# Patient Record
Sex: MALE | Race: WHITE | Employment: OTHER | URBAN - METROPOLITAN AREA
[De-identification: names, ages, dates, MRNs, and addresses within clinical notes are randomized per-mention and may not be internally consistent; named-entity substitution may affect disease eponyms.]

---

## 2018-05-02 PROBLEM — K21.9 GASTROESOPHAGEAL REFLUX DISEASE: Status: ACTIVE | Noted: 2018-05-02

## 2018-05-02 PROBLEM — F17.210 TOBACCO SMOKER, LESS THAN 10 CIGARETTES PER DAY: Status: ACTIVE | Noted: 2018-05-02

## 2018-05-02 PROBLEM — E55.9 VITAMIN D DEFICIENCY: Status: ACTIVE | Noted: 2018-05-02

## 2018-05-02 PROBLEM — N13.8 BPH WITH OBSTRUCTION/LOWER URINARY TRACT SYMPTOMS: Status: ACTIVE | Noted: 2018-05-02

## 2018-05-02 PROBLEM — N40.1 BPH WITH OBSTRUCTION/LOWER URINARY TRACT SYMPTOMS: Status: ACTIVE | Noted: 2018-05-02

## 2018-05-02 NOTE — TELEPHONE ENCOUNTER
Medical Records Request received from pt requesting ALL records from Dr Reyna Ta (NEW PT) Record Request faxed to them at [phone]  # [phone]

## 2018-05-02 NOTE — PROGRESS NOTES
Patient presents with:  Establish Care: New patient     HPI:   Sherice Flynn is a 80year old male who presents to the office as a new patient for medication refills. His previous doctor has moved back to [place].   Dr. Sarai Cho (was being seen in [place] • No Known Problems Daughter          Social History  Social History   Marital status:   Spouse name: N/A    Years of education: N/A  Number of children: N/A     Occupational History  None on file     Social History Main Topics   Smoking status: C rhythm  Abdomen: soft, non-tender; bowel sounds normal; no masses,  no organomegaly  Extremities: extremities normal, atraumatic, no cyanosis or edema. R foot with thickened callouses on bottom. Between 3 and 4 toes, wart noted.   Pulses: 2+ and symmetric refilled  Check CBC  - Ferrous Sulfate 325 (65 Fe) MG Oral Tab; Take 1 tablet (325 mg total) by mouth daily with breakfast.  Dispense: 90 tablet; Refill: 3  - CBC WITH DIFFERENTIAL WITH PLATELET; Future    4.  Hyperlipidemia, unspecified hyperlipidemia type

## 2018-05-25 NOTE — TELEPHONE ENCOUNTER
Marco Oleary from [facility] in [facility] is calling to clarify diagnoses code for patient's labs.

## 2018-05-30 PROBLEM — I12.9 CKD STAGE 4 SECONDARY TO HYPERTENSION (HCC): Status: ACTIVE | Noted: 2018-05-30

## 2018-05-30 PROBLEM — N18.4 CKD STAGE 4 SECONDARY TO HYPERTENSION (HCC): Status: ACTIVE | Noted: 2018-05-30

## 2018-05-30 PROBLEM — R73.03 PRE-DIABETES: Status: ACTIVE | Noted: 2018-05-30

## 2018-05-30 NOTE — PATIENT INSTRUCTIONS
To do list:  [  ] see kidney specialist (out in [facility] if insurance will allow it)  [  ] start taking the water pill furosemide  [  ] My office will track down the records of your colonoscopy from a few yrs ago  [  ] would like to get the old medical recor criteria:   • Men who are 73-68 years old and have smoked more than 100 cigarettes in their lifetime   • Anyone with a family history    Colorectal Cancer Screening Covered up to Age 76     Colonoscopy Screen   Covered every 10 years- more often if abnorma Homosexual men   Illicit injectable drug abusers     Tetanus Toxoid- Only covered with a cut with metal- TD and TDaP Not covered by Medicare Part B) No orders found for this or any previous visit.  This may be covered with your prescription benefits, but

## 2018-06-06 NOTE — TELEPHONE ENCOUNTER
Received Form from [facility] requesting last office notes,1 yr of BMP labs,any Renal testing and med list.

## 2018-06-06 NOTE — TELEPHONE ENCOUNTER
Faxed back records requested (recent office notes and med list) NO other testing or labs to [phone]

## 2018-06-08 NOTE — TELEPHONE ENCOUNTER
Message     Message Contents   MD Jesica Armstrong RN             Colonoscopy - yes, last 3 yrs ago. Deirdre Ashby at [facility] in [facility].

## 2018-06-08 NOTE — TELEPHONE ENCOUNTER
[address] [address] [address] [place] [address]  [phone]    Call and request a copy of Colonoscopy

## 2018-06-11 NOTE — TELEPHONE ENCOUNTER
Pts daughter Dr Michelle Bishop is asking for labs to be faxed to [phone], Attn: Rasheeda 07/13/18 at 11 am with Dr Michelle Bishop. They are from the nephrology office phone number is [phone].

## 2018-06-11 NOTE — TELEPHONE ENCOUNTER
[address] and was given Fax # [phone] for us to fax over on cover sheet requesting Colonoscopy for pt  Fax sent

## 2018-06-18 NOTE — TELEPHONE ENCOUNTER
Fax sent again to [phone] [facility] to get copy of pt's Colonoscopy

## 2018-07-02 NOTE — TELEPHONE ENCOUNTER
Spoke to Medical Records and informed them that we had already faxed over request on pt's Colonoscopy twice.  Was informed to re-fax again and they will take care of it and notify us if no records were found  (fax # [phone])

## 2018-07-13 NOTE — PROGRESS NOTES
Nephrology Consult Note    REASON FOR CONSULT: CKD 4     ASSESSMENT/PLAN:        1) CKD 4- recent serum creatinine 2.55 mg/dL is of unclear duration; creatinine was 0.9 mg/dL on an ER visit in 2012.   The differential diagnosis includes prerenal azotemia du having nearly  in  from decompensated liver failure due to alcoholic hepatitis and cirrhosis. Denies any history of acute or chronic renal failure gross microscopic hematuria proteinuric kidney stones or infections.   No history of cardiac pulmonar by mouth daily.  Disp: 90 tablet Rfl: 3   Ferrous Sulfate 325 (65 Fe) MG Oral Tab Take 1 tablet (325 mg total) by mouth daily with breakfast. Disp: 90 tablet Rfl: 3   omeprazole 20 MG Oral Capsule Delayed Release Take 1 capsule (20 mg total) by mouth every extremities  Skin: Warm and dry, no rashes        Kaur Edwards Cha, MD  7/13/2018  11:58 AM

## 2018-07-16 NOTE — TELEPHONE ENCOUNTER
Received Colonoscopy results for pt from [address] [place] [address].  Results handed to Dr Rory Henriquez

## 2018-07-27 ENCOUNTER — TELEPHONE (OUTPATIENT)
Dept: NEPHROLOGY | Facility: CLINIC | Age: 68
End: 2018-07-27

## 2018-07-27 NOTE — TELEPHONE ENCOUNTER
Needs clarification on order. Test not run: monoclonal protein study. Will hold tube until Monday. [phone]. Pls call to clarify of this is protein electrophoresis.

## 2018-08-07 NOTE — TELEPHONE ENCOUNTER
Patient's daughter faxed over patient's test results and blood pressure readings.  Daughter would like to know if patient should follow with Dr. Carson Heath or Dr. Quinton Iglesias  Daughter states- Dr. Quinton Iglesias recommended patient to stop taking amlopidine besylate (due to swe

## 2018-08-07 NOTE — TELEPHONE ENCOUNTER
\Renata notified. Shannon Ramsey will explain to Pt. Shannon Ramsey will call office if there is a problem.

## 2018-08-07 NOTE — TELEPHONE ENCOUNTER
BPs consistently elevated  Serum creatinine elevated - 2.3  Need to control BP further without affecting kidney  Pulse has been uppers 60s-70-80s. Will add carvedilol.   Start with 12.5mg tab - first day take a 1/2 tab in the AM and 1/2 tab in the PM.  On

## 2018-08-09 NOTE — TELEPHONE ENCOUNTER
D/w daughter- will resume amlodipine 5 mg qd, continue metoprolol (hold off on coreg for now), and start torsemide 20 mg qd; will schedule renal biopsy given proteinuria- johny cazares

## 2018-08-09 NOTE — TELEPHONE ENCOUNTER
Pt's daughter needs clarification on meds. At ofc visit, you stopped amlodipine (swelling) and increased enalapril. Do you want him to go back on the amlodipine? Should he decrease enalapril back to original dose or continue increased dose?   [phone]

## 2018-11-30 NOTE — PROGRESS NOTES
Patient presents with:  Renal Failure: sees Dr Arden Allen  Imm/Inj: flu shot for Seniors     HPI:   Hoa Lambert is a 80year old male who presents to the office for f/u renal failure. Seeing Dr. Arden Allen - nephrology. The plan is BP control.       BP at home well appearing, and in no distress  Chest - clear to auscultation, no wheezes, rales or rhonchi, symmetric air entry  Heart - normal rate, regular rhythm, normal S1, S2, no murmurs, rubs, clicks or gallops  Extremities - peripheral pulses normal, no pedal

## 2019-04-09 NOTE — TELEPHONE ENCOUNTER
Medication refilled per protocol.      Requested Prescriptions     Signed Prescriptions Disp Refills   • ROSUVASTATIN CALCIUM 10 MG Oral Tab 90 tablet 1     Sig: TAKE 1 TABLET BY MOUTH ONCE DAILY AT NIGHT     Authorizing Provider: Patrick Ochoa

## 2019-04-25 NOTE — TELEPHONE ENCOUNTER
LOV 11/30/2018     Future Appointments   Date Time Provider Cee Frida   5/29/2019 11:00 AM Jomar Clement MD [facility]    Metoprolol fails protocol because last creatinine level was 2.35 on 7/27/18. Last potassium level was 4.8 on 7/27/18

## 2019-05-23 NOTE — TELEPHONE ENCOUNTER
Called patient to fill out annual health assesment form, spoke with patient's daughter jami, completed form, form in triage.

## 2019-05-29 PROBLEM — E55.9 VITAMIN D DEFICIENCY: Status: RESOLVED | Noted: 2018-05-02 | Resolved: 2019-05-29

## 2019-05-29 NOTE — PROGRESS NOTES
HPI:   Rox Win is a 77year old male who presents for a MA (Medicare Advantage) Supervisit (Once per calendar year). Preventative Screening  Colonoscopy - overdue. Pt says he has a c-scope in [facility] around three years ago.   He repor planning including the explanation and discussion of advance directives standard forms performed Face to Face with patient and Family/surrogate (if present), and forms available to patient in AVS         He currently smokes tobacco.  Social History    [place] times daily. amLODIPine Besylate 5 MG Oral Tab Take 1 tablet (5 mg total) by mouth daily. torsemide 20 MG Oral Tab Take 1 tablet (20 mg total) by mouth daily.    DUTASTERIDE-TAMSULOSIN HCL 0.5-0.4 MG Oral Cap TAKE 1 CAPSULE BY MOUTH ONCE DAILY   SANDY the following:    Height as of this encounter: 60.25\". Weight as of this encounter: 187 lb.     Medicare Hearing Assessment  (Required for AWV/SWV)    Finger Rub          Visual Acuity  Right Eye Visual Acuity: Uncorrected Right Eye Chart Acuity: 20/60 well  Several ongoing medical issues but all seemingly stable  c-socpe - patient reports EGD and cscope in [place] about 3 yrs ago. Will try to get more info, records. Immun UTD. Will eventually need #2 PNA 23 vaccine.     2. Essential hypertens symptoms  Known BPH  No current limitations, urination ko. 13. Gastroesophageal reflux disease, esophagitis presence not specified  Taking omepeprazole  Given h/o cirrhosis, need to prevent acid and varices.              Diet assessment: fair     PLAN: 05/25/2020  Update Health Maintenance if applicable     Immunizations (Update Immunization Activity if applicable)     Influenza  Covered Annually 11/30/2018   Please get every year    Pneumococcal 13 (Prevnar)  Covered Once after 65 05/30/2018 Please get

## 2019-05-29 NOTE — PATIENT INSTRUCTIONS
Mega Silva's SCREENING SCHEDULE   Tests on this list are recommended by your physician but may not be covered, or covered at this frequency, by your insurer. Please check with your insurance carrier before scheduling to verify coverage.     PREVEN Health Maintenance if applicable    Flex Sigmoidoscopy Screen  Covered every 5 years No results found for this or any previous visit. No flowsheet data found.      Fecal Occult Blood   Covered Annually No results found for: FOB, OCCULTSTOOL No flowsheet aric (Not covered by Medicare Part B) No orders found for this or any previous visit. This may be covered with your pharmacy  prescription benefits     Recommended Websites for Advanced Directives    [email or web address]

## 2019-09-20 NOTE — TELEPHONE ENCOUNTER
Results received and reviewed by Dr Christie López. She requests patient needs to go to the ER. Attempted to call patient on mobile number, no answer/no voicemail. Left message on home number for patient to call back today.      Also spoke with patient's daughter (

## 2019-09-20 NOTE — TELEPHONE ENCOUNTER
? Do we have any of the other results? That is remarkably high. He would need to go to ER for evaluation if that is the case.

## 2019-09-20 NOTE — TELEPHONE ENCOUNTER
Referral entered for Dr Arden Allen. Daughter is going to try to get him in some time early next week.

## 2019-09-20 NOTE — TELEPHONE ENCOUNTER
Christina Graff from [address] [place] [address]  is calling to speak with a nurse or Dr. Vicky Downing regarding the patient's abnormal labs.

## 2019-09-20 NOTE — TELEPHONE ENCOUNTER
Spoke to ER provider. He had questioned reason for sending to ER. States patient asymptomatic and appears well on exam (no signs of infection, pain, dehydration, etc). He had recommended outpatient follow up with nephrology.  Sent message to Dr. Grace Kothari who duron

## 2019-09-20 NOTE — TELEPHONE ENCOUNTER
Hi Dr. Chantelle Burgos all is well with you. We are helping to try to manage this patient's labs while his PCP is out of the office. It looks like you had seen this patient last July.  We received critical results from an outside lab 92, creatinine 3.84 this

## 2019-09-20 NOTE — TELEPHONE ENCOUNTER
Rhonda Gu at [facility] called to report a critical lab value_BUN 92. Asked that he fax us the results as well. Routed to Dr Pat Colby.

## 2019-09-23 NOTE — TELEPHONE ENCOUNTER
D/w daughter- needs kidney biopsy- to be scheduled before office appt 10/9- was supposed to have this done August 2018 but never followed thru- johny cazares

## 2019-09-26 NOTE — TELEPHONE ENCOUNTER
Received fax from THE [facility], patient having Renal Biopsy 10/09/19 with Dr. Hosea Mena. Patient scheduled with Dr. Jone Ann 10/02/19.  Fax in triage

## 2019-10-02 NOTE — PROGRESS NOTES
Patient presents with:  Pre-Op Exam: renal biopsy on 10/09/2019     HPI:   Christine Helms is a 77year old male who is being evaluated for pre-surgical clearance at the request of Dr. Trinity Curiel.    Surgery: renal biopsy  Surgeon: Trinity Curiel  Date of Surgery: 10/9/ tablet Rfl: 3   OMEPRAZOLE 20 MG Oral Capsule Delayed Release TAKE 1 CAPSULE BY MOUTH ONCE DAILY BEFORE BREAKFAST Disp: 90 capsule Rfl: 3   METOPROLOL SUCCINATE ER 25 MG Oral Tablet 24 Hr TAKE 1 TABLET BY MOUTH TWICE DAILY Disp: 180 tablet Rfl: 3   ROSUVAS daughter Chito Jarvis  Eyes - pupils equal and reactive, extraocular eye movements intact  Nose - normal and patent, no erythema, discharge or polyps  Mouth - mucous membranes moist, pharynx normal without lesions  Chest - clear to auscultation, no wheezes, rales

## 2019-10-09 NOTE — PROCEDURES
[facility]  Procedure Note    Robin Marker Patient Status:  Outpatient    1950 MRN [id number]   [facility] CT Attending Inge Reeves MD   [facility] Day # 0 PCP Shailesh Rubio MD     Procedure: CT guided renal biopsy    Pre-Pr

## 2019-10-09 NOTE — IMAGING NOTE
Post kidney biopsy. Tolerated procedure and sedation well. Right flank with tegaderm CDI. Report called to [facility]. Pt drowsy and oriented x 4, but easily aroused with voice commands. Denies any pain.

## 2019-10-11 NOTE — TELEPHONE ENCOUNTER
Requested Prescriptions     Pending Prescriptions Disp Refills   • ROSUVASTATIN CALCIUM 10 MG Oral Tab [Pharmacy Med Name: ROSUVASTATIN 10MG TAB] 90 tablet 1     Sig: TAKE 1 TABLET BY MOUTH NIGHTLY     LOV 10/2/2019     Patient was asked to follow-up in: P

## 2019-10-14 NOTE — PROGRESS NOTES
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    ROS:    Denies fever/chills  Denies wt loss/gain  Denies HA or visual changes  Denies CP or palpitations  Denies SOB/cough/hemoptysis  Denies abd or flank pain  Denies N/V/D  Denies change in urinary habits or gross hematuria  Denies LE edema  Ruy MOUTH ONCE DAILY, Disp: 90 tablet, Rfl: 3  OMEPRAZOLE 20 MG Oral Capsule Delayed Release, TAKE 1 CAPSULE BY MOUTH ONCE DAILY BEFORE BREAKFAST, Disp: 90 capsule, Rfl: 3  METOPROLOL SUCCINATE ER 25 MG Oral Tablet 24 Hr, TAKE 1 TABLET BY MOUTH TWICE DAILY, Graham Johnson bowel sounds, no palpable organomegaly  Extremities: Without clubbing, cyanosis or edema.   Neurologic:  normal affect, cranial nerves grossly intact, moving all extremities  Skin: Warm and dry, no rashes        Kaur Ontiveros MD  10/14/2019  210 PM

## 2019-10-21 NOTE — TELEPHONE ENCOUNTER
Patient currently paying $258 for a 90 day supply of DUTASTERIDE-TAMSULOSIN HCL 0.5-0.4 MG Oral Cap    Reviewed GoodRx:     Combo $160.49 at [facility] (90 days)     Alternative:  Dutasteride 0.5mg #90 tabs - $40.61 at [facility]   Tamsulosin 0.4mg  #90 tabs - $

## 2019-10-21 NOTE — TELEPHONE ENCOUNTER
Patient's son is calling he wants to know if there is anything to lower his costs in a 80 supply for his Dutasteride-Tamsulosin. He has lost his drug insurance coverage. The son is not on his FYI.  His father is there and can give permission verbally to

## 2019-10-21 NOTE — TELEPHONE ENCOUNTER
Absolutely splitting the meds is an option. I'm more than happy to order this, and try to cut down the costs    orders sent. Jevon Jaramillo

## 2019-11-07 NOTE — TELEPHONE ENCOUNTER
Please call back with  Creatine level for her Father. She said Dr Noam Malin her father personally and said his levels were better. But with her dads broken language. ... she wanted to make sure that is was true.  Please call Daughter Osito Melchor back to go over

## 2019-11-08 NOTE — TELEPHONE ENCOUNTER
D/w daughter at length- pt to proceed with transplant eval as renal function will likely decline slowly- johny cazares

## 2019-11-12 NOTE — TELEPHONE ENCOUNTER
Son reports patient with sneezing and cough x 3 days. Afebrile. Son asking what patient can take for cough. D/w  [facility] who recommends:     Patient avoid anything with sudafed. Can try: Mucinex, Coricidin or Robitussin.     Reviewed directives with son (

## 2019-11-12 NOTE — TELEPHONE ENCOUNTER
Patient's son is calling to find out what patient can take for the flu.  Patient's son in not listed on his HIPAA but patient is with him to give nurse a verbal to allow us to speak to his son

## 2019-11-26 NOTE — PROGRESS NOTES
Patient presents with:  ER F/U: wheezzig and cough        HISTORY OF PRESENT ILLNESS  Layo Kelsey is a 77year old male who presents for follow up ER visit. He was seen at [facility] on 11.21 for evaluation of cough.  He was dx with uvulitis a tablet (5 mg total) by mouth daily. , Disp: 90 tablet, Rfl: 3  •  torsemide 20 MG Oral Tab, Take 1 tablet (20 mg total) by mouth daily. , Disp: 90 tablet, Rfl: 3  •  POTASSIUM CHLORIDE ER 10 MEQ Oral Tab CR, TAKE 1 TABLET BY MOUTH ONCE DAILY, Disp: 90 tablet Posterior oropharynx clear without exudate or erythema. NECK: Supple without lymphadenopathy. CARDIOVASCULAR: Regular rate and rhythm, no murmurs/ rubs/ gallops. PULMONARY: Normal effort on room air. Clear to auscultation bilaterally.  No adventitious br

## 2020-04-01 NOTE — TELEPHONE ENCOUNTER
Requested Prescriptions     Pending Prescriptions Disp Refills   • ROSUVASTATIN CALCIUM 10 MG Oral Tab [Pharmacy Med Name: ROSUVASTATIN 10MG TABLETS] 90 tablet 0     Sig: TAKE 1 TABLET BY MOUTH EVERY NIGHT AT BEDTIME     LOV 11/26/2019         Appointment

## 2020-04-16 NOTE — TELEPHONE ENCOUNTER
Refill request for:    Requested Prescriptions     Pending Prescriptions Disp Refills   • METOPROLOL SUCCINATE ER 25 MG Oral Tablet 24 Hr [Pharmacy Med Name: METOPROLOL ER SUCCINATE 25MG TABS] 180 tablet 3     Sig: TAKE 1 TABLET BY MOUTH TWICE DAILY   • FE

## 2020-05-29 NOTE — TELEPHONE ENCOUNTER
We can approve this for the time being but with kidney function where it is I would encourage them to contact nephro Gloria Harada to make sure we are not making the kidney worse by using this med at this dose

## 2020-05-29 NOTE — TELEPHONE ENCOUNTER
Refill request for Torsemide fails protocol because last creatinine level was 3.84 on 9/20/2019. LOV 11/26/2019. No future appointments. Routed to Dr Michael Malik.

## 2020-06-01 NOTE — TELEPHONE ENCOUNTER
Betsey Nazario patient's daughter is asking if her dad should continue taking torsemide 20 mg because Gerald Cruz only refilled it for 30 days because he states he doesn't know if he should still take it because if his kidney issues.  Please call Betsey Nazario back to explain

## 2020-07-06 NOTE — TELEPHONE ENCOUNTER
Son is calling and the Patient was told he had enough pills and we denied his refill,  but the bottle says 30 pills no refills. Torsemide 20 mg patient is completely out.   He needs it ASAP to [facility] [place]    Dr. Ernie Figueroa said the benefits in taking th

## 2020-07-06 NOTE — TELEPHONE ENCOUNTER
Chart reviewed - appears Dr Ernie Figueroa refilled this for him on 6/1 with 1 yr worth of refills. Called and advised pt of this. Patient verbalized understanding.

## 2020-07-14 NOTE — TELEPHONE ENCOUNTER
Please assist patient in scheduling appointment      Refill request for:          Requested Prescriptions     Pending Prescriptions Disp Refills   • METOPROLOL SUCCINATE ER 25 MG Oral Tablet 24 Hr [Pharmacy Med Name: METOPROLOL ER SUCCINATE 25MG TABS] 180

## 2020-10-12 NOTE — PROGRESS NOTES
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  90 tablet 3   • POTASSIUM CHLORIDE ER 10 MEQ Oral Tab CR TAKE 1 TABLET BY MOUTH ONCE DAILY 90 tablet 3   • OMEPRAZOLE 20 MG Oral Capsule Delayed Release TAKE 1 CAPSULE BY MOUTH ONCE DAILY BEFORE BR bowel sounds, no palpable organomegaly  Extremities: Without clubbing, cyanosis or edema.   Neurologic:  normal affect, cranial nerves grossly intact, moving all extremities  Skin: Warm and dry, no rashes        Ken-Ap Mora MD  10/12/2020  136 PM

## 2020-10-15 NOTE — TELEPHONE ENCOUNTER
Please enter lab orders for the patient's upcoming physical appointment. Physical scheduled:    Your appointments     Date & Time Appointment Department [facility])    Nov 06, 2020  1:30 PM JANINA PERERA Supervisit with Sundeep Tenorio MD [facility],

## 2020-10-16 NOTE — TELEPHONE ENCOUNTER
John Hutton did BMP. I expanded on these for the rest of the \"physical panel\". Up to them whether they do it in advance of after the visit.

## 2020-10-20 NOTE — TELEPHONE ENCOUNTER
Daughter called asking us to fax his lab orders to the lab near their home at [phone]. It is a secured fax number in the lab also used by Dr. Froy Cheek printed and faxed to [phone].

## 2020-10-26 NOTE — TELEPHONE ENCOUNTER
Pt's son callled. Pt's BP have been elevated since he stopped taking enalapril. One BP \"went all the way up to 166. \"  Pt re-started enalapril on 10-24-20. Pls call pt's son. [phone].

## 2020-10-30 NOTE — TELEPHONE ENCOUNTER
Received incoming test results from [address] [place] [address]. Test results placed in Dr. Shannon Catalan.

## 2020-11-06 NOTE — PATIENT INSTRUCTIONS
Mega Silva's SCREENING SCHEDULE   Tests on this list are recommended by your physician but may not be covered, or covered at this frequency, by your insurer. Please check with your insurance carrier before scheduling to verify coverage.     PREVEN Health Maintenance if applicable    Flex Sigmoidoscopy Screen  Covered every 5 years No results found for this or any previous visit. No flowsheet data found.      Fecal Occult Blood   Covered Annually No results found for: FOB, OCCULTSTOOL No flowsheet aric benefits, but Medicare does not cover unless Medically needed    Zoster (Not covered by Medicare Part B) No orders found for this or any previous visit.  This may be covered with your pharmacy  prescription benefits     Recommended Websites for Advanced Dir

## 2020-11-06 NOTE — PROGRESS NOTES
HPI:   Phan Salazar is a 72year old male who presents for a MA (Medicare Advantage) Supervisit (Once per calendar year). Preventative Screening  Colonoscopy - 2013. Repeat 10 yrs - 2023  Prostate - +BPH. On dutasteide, Flomax.   Immunizations - History    Tobacco Use      Smoking status: Current Every Day Smoker        Packs/day: 0.50        Years: 50.00        Pack years: 25      Smokeless tobacco: Never Used     This is a tobacco user, and I will give tobacco cessation counseling today.  (update MOUTH EVERY DAY    •  DUTASTERIDE 0.5 MG Oral Cap, TAKE ONE CAPSULE BY MOUTH EVERY DAY    •  ROSUVASTATIN CALCIUM 10 MG Oral Tab, TAKE 1 TABLET BY MOUTH EVERY NIGHT AT BEDTIME    •  torsemide 20 MG Oral Tab, Take 1 tablet (20 mg total) by mouth daily.     • 154/66   Pulse 72   Temp 98.5 °F (36.9 °C) (Temporal)   Resp 16   Ht 65\"   Wt 190 lb 6.4 oz (86.4 kg)   BMI 31.68 kg/m²   Estimated body mass index is 31.68 kg/m² as calculated from the following:    Height as of this encounter: 65\".     Weight as of this ASSESSMENT AND OTHER RELEVANT CHRONIC CONDITIONS:   Linus Cortes is a 72year old male who presents for a Medicare Assessment.      PLAN SUMMARY:     Linus Cortes was seen in the office today:  had concerns including Well Adult (MA) and Bloo trying?: 2 - No  Has your appetite been poor?: No  How does the patient maintain a good energy level?: Other(yard work)  How would you describe your daily physical activity?: Heavy  How would you describe your current health state?: Good  How do you mainta IX concentrates   Clients of institutions for the mentally retarded   Persons who live in the same house as a HepB virus carrier   Homosexual men   Illicit injectable drug abusers     Tetanus Toxoid  Only covered with a cut with metal- TD and TDaP Not cove

## 2020-11-10 NOTE — TELEPHONE ENCOUNTER
Called and talked to daughter discussed new medication with her she will tell her dad and have him call if he has questions

## 2020-11-10 NOTE — TELEPHONE ENCOUNTER
I was able to get in touch with the kidney specialist.  Given the kidney, we are going to take a different approach to the blood pressure  Will add a new med called hydralazine.   Its a good BP med that does not interfere with kidney function, so it should

## 2020-11-11 NOTE — TELEPHONE ENCOUNTER
Pt's son Elmer Santamaria states they just picked medication hydrALAzine HCl 10 MG for pt. Should he continue to take his other medications?

## 2020-11-11 NOTE — TELEPHONE ENCOUNTER
Please call pt to discuss medications:  Amlodipine and hyralazine should pt be taking both medications.

## 2020-11-16 NOTE — TELEPHONE ENCOUNTER
Son reports patient started taking hydrazlazine 10mg on 11/10 and his BP has increased every day since.  Recent BP readings:    111/10 144/30  11/11 145/74  11/12 152/77  11/13 153/77  11/14 155/75  11/15 144/71    Son reports patient also getting swelling

## 2020-11-16 NOTE — TELEPHONE ENCOUNTER
Pt's blood pressures have been elevated and he has swelling in feet and legs. Pt's son wants to talk to you. He thinks this is related to pt starting hydralazine. [phone].

## 2020-11-17 NOTE — TELEPHONE ENCOUNTER
D/w son- continue metoprolol / amlodipine; resume enalapril 5 mg qd in place of hydralazine; check BMP in 1 week (K)- johny cazares

## 2020-11-21 DIAGNOSIS — I10 ESSENTIAL HYPERTENSION: ICD-10-CM

## 2020-11-23 RX ORDER — TAMSULOSIN HYDROCHLORIDE 0.4 MG/1
0.4 CAPSULE ORAL DAILY
Qty: 90 CAPSULE | Refills: 1 | Status: SHIPPED | OUTPATIENT
Start: 2020-11-23 | End: 2021-05-06

## 2020-11-23 RX ORDER — METOPROLOL SUCCINATE 25 MG/1
25 TABLET, EXTENDED RELEASE ORAL 2 TIMES DAILY
Qty: 180 TABLET | Refills: 1 | Status: SHIPPED | OUTPATIENT
Start: 2020-11-23 | End: 2021-05-19

## 2020-11-23 RX ORDER — FERROUS SULFATE 325(65) MG
1 TABLET ORAL
Qty: 90 TABLET | Refills: 1 | Status: SHIPPED | OUTPATIENT
Start: 2020-11-23 | End: 2021-05-06

## 2020-11-23 NOTE — TELEPHONE ENCOUNTER
Pt contacted [facility] on Saturday. Pt is requesting refills for Metoprolol, Tamsulosin & Ferosul be sent to [place].

## 2020-12-03 NOTE — TELEPHONE ENCOUNTER
D/W daughter- labs stable on ACE-I with K 4.6 + Cr 3.2; home SBP < 150- to continue enalapril 5 mg qd + amlodipine 5 mg qd + hydralazine 10 mg tid- [facility]- x sage

## 2021-01-04 NOTE — TELEPHONE ENCOUNTER
Pt only has 2 pills left of ROSUVASTATIN CALCIUM 10 MG Oral Tab and pt requesting a refill be sent to [place].

## 2021-01-06 NOTE — TELEPHONE ENCOUNTER
Son Vicky Maldonado calling for the 3rd time for refill on medication as pt is now out (Rosuvastatin)

## 2021-01-13 NOTE — TELEPHONE ENCOUNTER
Son asking if pt should get his Covid vaccine out in [facility] as they are offering it to him next week

## 2021-04-05 RX ORDER — FAMOTIDINE 20 MG/1
TABLET, FILM COATED ORAL
Qty: 90 TABLET | Refills: 1 | Status: SHIPPED | OUTPATIENT
Start: 2021-04-05 | End: 2021-11-02

## 2021-04-23 NOTE — PROGRESS NOTES
Patient presents with:  Pre-Op Exam: Eye Surgery, 5/3/21 Dr. Clement Hernandez    HPI:   Herson Bearden is a 80year old male who is being evaluated for pre-surgical clearance at the request of Dr. Clement Hernandez.    Surgery: cataract surgery  Surgeon: Dr. Maddie Perea 1  hydrALAzine HCl 10 MG Oral Tab, Take 1 tablet (10 mg total) by mouth 3 (three) times daily. , Disp: 90 tablet, Rfl: 1  traMADol HCl 50 MG Oral Tab, Take 50 mg by mouth every 8 (eight) hours. , Disp: , Rfl:   AMLODIPINE BESYLATE 5 MG Oral Tab, TAKE 1 TABLE History    Tobacco Use      Smoking status: Current Every Day Smoker        Packs/day: 0.50        Years: 50.00        Pack years: 25      Smokeless tobacco: Never Used    Alcohol use: Not Currently      Comment: h/o alcohol, clean since 2009       REVIEW prescribed. 5. Hyperlipidemia, unspecified hyperlipidemia type  On Crestor  Continue med    6. Gastroesophageal reflux disease, unspecified whether esophagitis present  PPI effective. No symptoms, no limitations on foods. Continue . 7.  Anemia due t

## 2021-04-30 NOTE — TELEPHONE ENCOUNTER
Pt's son looking for a new ophthalmalgist  through [facility]. Pt has had a bad experience with Mahsa Newton's office and does not wish to have his surgery done with him. Surgery for 5/3 has been cancelled. Pt's son is asking for someone in the [facility] area.

## 2021-05-06 NOTE — TELEPHONE ENCOUNTER
Pt requesting a refill on tamsulosin/ Ferrous Sulfate. Pt states he did reach out to the pharmacy and he was told there were no more refills. Pt states he is out of medication.

## 2021-05-10 NOTE — TELEPHONE ENCOUNTER
Spoke to [facility] and they have pt's prescriptions ready for .  Notified pt;s daughter, Missy Syed

## 2021-05-10 NOTE — TELEPHONE ENCOUNTER
Pt is out of the medication and [facility], [address] [facility] is stating the never received it. Tamsulosin/Ferrous Sulfate. Please send again today and call pt when sent.

## 2021-05-17 NOTE — TELEPHONE ENCOUNTER
Talked to son pulse has been running in the 50's and he was concerned but patient has no problems not dizzy or light headed no problems getting to do what he wants to.  He will monitor and let us know if some thing changes

## 2021-05-17 NOTE — TELEPHONE ENCOUNTER
Pt's son is calling his father's pulse is at 48. He is concerned, father won't say if he is not feeling well.

## 2021-05-19 NOTE — TELEPHONE ENCOUNTER
Requested Prescriptions     Pending Prescriptions Disp Refills   • METOPROLOL SUCCINATE ER 25 MG Oral Tablet 24 Hr [Pharmacy Med Name: METOPROLOL ER SUCCINATE 25MG TABS] 180 tablet 1     Sig: TAKE 1 TABLET(25 MG) BY MOUTH TWICE DAILY     LOV 4/23/2021

## 2021-06-11 NOTE — TELEPHONE ENCOUNTER
Patient is scheduled for a pre op with Dr. Jazz Ascencio on 6/23/21. Paperwork received via fax from Dr. Prisca Maloney. Cataract surgery on 7/6/21. Paperwork in triage H&P needed.

## 2021-06-23 NOTE — PROGRESS NOTES
Patient presents with:  Pre-Op Exam: Cataract Surgery with     HPI:   Casimiro Almendarez is a 80year old male who is being evaluated for pre-surgical clearance at the request of Dr. Soy Martinez.    Surgery: L eye cataract  Surgeon: Myron Arora MG Oral Tab, Take 1 tablet (10 mg total) by mouth 3 (three) times daily. , Disp: 90 tablet, Rfl: 1  traMADol HCl 50 MG Oral Tab, Take 50 mg by mouth every 8 (eight) hours. , Disp: , Rfl:   AMLODIPINE BESYLATE 5 MG Oral Tab, TAKE 1 TABLET BY MOUTH EVERY DAY, Smoking status: Current Every Day Smoker        Packs/day: 0.50        Years: 50.00        Pack years: 25      Smokeless tobacco: Never Used    Alcohol use: Not Currently      Comment: h/o alcohol, clean since 2009       REVIEW OF SYSTEMS:   Kulwant Hyperlipidemia, unspecified hyperlipidemia type  On Crestor  Continue med     6. Gastroesophageal reflux disease, unspecified whether esophagitis present  PPI effective. No symptoms, no limitations on foods. Continue .     7.  Anemia due to alcoholism ([facility] [address]

## 2021-06-23 NOTE — TELEPHONE ENCOUNTER
Paperwork received for Surgery from Dr Vel Forte  and faxed to [address] @ [phone]

## 2021-07-10 NOTE — TELEPHONE ENCOUNTER
Refill request for:    Requested Prescriptions     Pending Prescriptions Disp Refills   • DUTASTERIDE 0.5 MG Oral Cap [Pharmacy Med Name: DUTASTERIDE 0.5MG CAPSULES] 90 capsule 3     Sig: TAKE ONE CAPSULE BY MOUTH EVERY DAY        Last Prescribed Quantity

## 2021-08-16 NOTE — TELEPHONE ENCOUNTER
Selene Gambino from [facility] requesting a call back from nurse regarding pt. No further information given.

## 2021-08-17 NOTE — TELEPHONE ENCOUNTER
Has been ill with flu seen in ED for cough and runny nose with difficulty breath, was given breathing treatment was neg covid had x-ray EKG and blood work at [facility].  His BP has been elevated 165/80 he is using an albuterol inhaler to help with his br

## 2021-08-17 NOTE — TELEPHONE ENCOUNTER
Was seen in ED on 8/12 at [facility] in [place].      Calcium on CMP was erroneously reported as 7.2     Corrected:  Calcium 9.1     FYI to Dr. Bridger Yancey

## 2021-08-17 NOTE — TELEPHONE ENCOUNTER
Pt's son states pt's BP has been higher than usual, 165/80. Recently seen in ER for COVID sx. Was put on inhaler. 2021

## 2021-08-17 NOTE — TELEPHONE ENCOUNTER
Called and talked to patient wife went over the instructions from Dr Rory Henriquez and they will do this then when he is better they will go back to 5 mg enalapril daily.

## 2021-08-17 NOTE — TELEPHONE ENCOUNTER
Agree.  Those BPs are running pretty high. I would increase the enalapril from 5mg to 10mg. Continue the albuterol as needed.

## 2021-09-14 NOTE — TELEPHONE ENCOUNTER
Please enter lab orders for the patient's upcoming physical appointment. Physical scheduled:    Your appointments     Date & Time Appointment Department [facility])    Oct 25, 2021 10:30 AM CDT [facility],

## 2021-10-13 NOTE — TELEPHONE ENCOUNTER
Yes, the article and news applies to him. No history of heart disease  No diabetes. asprin may not be effective at preventing heart attack  Data is pretty mixed, but seems to favor the do not take it in the littlest margin  Can stop if he would like.

## 2021-10-25 NOTE — PROGRESS NOTES
HPI:   Viral Layne is a 80year old male who presents for a MA (Medicare Advantage) [facility] (Once per calendar year). Preventative Screening  Colonoscopy - 5/2013. Repeat 10 yrs - 2023  Prostate - 0.07 a year ago.   Repeat labs ordered Pack years: 25      Smokeless tobacco: Never Used     This is a tobacco user, and I gave tobacco cessation counseling today. CAGE screening score of 0 on 10/25/2021, showing low risk of alcohol abuse.          Patient Care Team: Patient Care T Oral Tab, Take 1 tablet (10 mg total) by mouth nightly. ofloxacin 0.3 % Ophthalmic Solution, Place 1 drop into the left eye 4 (four) times daily. prednisoLONE acetate 1 % Ophthalmic Suspension, Place 1 drop into the left eye 4 (four) times daily.   hydrAL negative  Ears, nose, mouth, throat, and face: negative  Respiratory: negative  Cardiovascular: negative  Gastrointestinal: negative  Genitourinary: negative  Neurological: negative      EXAM:   /66   Pulse 56   Temp 97.4 °F (36.3 °C) (Tympanic)   Re (62115) 11/30/2018   • Pneumococcal (Prevnar 13) 05/30/2018   • Pneumovax 23 11/06/2020        ASSESSMENT AND OTHER RELEVANT CHRONIC CONDITIONS:   Haleigh Briones is a 80year old male who presents for a Medicare Assessment.      PLAN SUMMARY: appetite been poor?: Yes  How does the patient maintain a good energy level?: Appropriate Exercise  How would you describe your daily physical activity?: Heavy  How would you describe your current health state?: Good  How do you maintain positive mental we Blood Test Covered annually -   Prostate Cancer Screening    Prostate-Specific Antigen (PSA) Annually No results found for: PSA  PSA due on 10/30/2022   Immunizations    Influenza Covered once per flu season  Please get every year -  No recommendations at

## 2021-10-25 NOTE — PATIENT INSTRUCTIONS
Johanna Bazzi's SCREENING SCHEDULE   Tests on this list are recommended by your physician but may not be covered, or covered at this frequency, by your insurer. Please check with your insurance carrier before scheduling to verify coverage. covered once after 65 Prevnar 13: 05/30/2018    Lemhcyopr43: 11/06/2020     No recommendations at this time    Hepatitis B One screening covered for patients with certain risk factors   -  No recommendations at this time    Tetanus Toxoid Not covered by Me

## 2021-11-01 NOTE — TELEPHONE ENCOUNTER
The labs are ordered  They have done this in the past  Are we able to print the labs and get them to this testing facility?

## 2021-11-01 NOTE — TELEPHONE ENCOUNTER
Pt is requesting to have labs sent to [address] [place] [address] we have sent them there in the past. I advised that we, [facility], have a lab facility in [facility] and they could just go there. Pt refused. Wants them ordered to [facility].  Please advise on

## 2021-11-02 DIAGNOSIS — I10 ESSENTIAL HYPERTENSION: ICD-10-CM

## 2021-11-04 RX ORDER — TAMSULOSIN HYDROCHLORIDE 0.4 MG/1
0.4 CAPSULE ORAL DAILY
Qty: 90 CAPSULE | Refills: 3 | Status: SHIPPED | OUTPATIENT
Start: 2021-11-04 | End: 2022-10-31

## 2021-11-04 RX ORDER — METOPROLOL SUCCINATE 25 MG/1
25 TABLET, EXTENDED RELEASE ORAL 2 TIMES DAILY
Qty: 180 TABLET | Refills: 3 | Status: SHIPPED | OUTPATIENT
Start: 2021-11-04 | End: 2022-10-31

## 2021-11-04 RX ORDER — FERROUS SULFATE 325(65) MG
1 TABLET ORAL
Qty: 90 TABLET | Refills: 3 | Status: SHIPPED | OUTPATIENT
Start: 2021-11-04 | End: 2022-10-31

## 2021-11-18 NOTE — PROGRESS NOTES
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)    • BPH BESYLATE 5 MG Oral Tab TAKE 1 TABLET BY MOUTH EVERY DAY 30 tablet 0   • Multiple Vitamins-Minerals (MULTI-VITAMIN/MINERALS) Oral Tab Take 1 tablet by mouth daily. • omega-3 fatty acids 1000 MG Oral Cap Take 1,000 mg by mouth 2 (two) times daily.      • oriented in no apparent distress. HEENT: No scleral icterus, MMM  Neck: Supple, no NATALIE or thyromegaly  Cardiac: Regular rate and rhythm, S1, S2 normal, no murmur or rub  Lungs: Clear without wheezes, rales, rhonchi.     Abdomen: Soft, non-tender. + bowel s

## 2021-12-27 NOTE — TELEPHONE ENCOUNTER
From: Nahun Billskaren [facility]  To: Brent Sanchez MD  Sent: 12/27/2021 1:16 PM CST  Subject: Test Results from 11/11/2021 continued    Dr. Michael Malik,   Below you will find test results 4 and 5 for my dad Nahun Melchor.  Please let me know if you cannot

## 2021-12-27 NOTE — TELEPHONE ENCOUNTER
Called and talked to wife she has the results but they did them at [phone]. She will send us a copy of the labs.  Refilled medication

## 2021-12-27 NOTE — TELEPHONE ENCOUNTER
Refill request for:    Requested Prescriptions     Pending Prescriptions Disp Refills   • ROSUVASTATIN 10 MG Oral Tab [Pharmacy Med Name: ROSUVASTATIN 10MG TABLETS] 90 tablet 3     Sig: TAKE 1 TABLET(10 MG) BY MOUTH EVERY NIGHT        Last Prescribed Quant

## 2021-12-27 NOTE — TELEPHONE ENCOUNTER
Spoke to pt's son, Kate Todd. He would like the refill for the pt for Rosuvastatin and he doesn't understand why this medication was denied.

## 2022-03-22 DIAGNOSIS — I10 ESSENTIAL HYPERTENSION: ICD-10-CM

## 2022-03-22 RX ORDER — AMLODIPINE BESYLATE 5 MG/1
TABLET ORAL
Qty: 60 TABLET | Refills: 0 | OUTPATIENT
Start: 2022-03-22

## 2022-03-31 NOTE — TELEPHONE ENCOUNTER
Wants to make sure it is ok for his father to receive the COVID booster for the second time  First booster was Nov 8,2021

## 2022-03-31 NOTE — TELEPHONE ENCOUNTER
Reviewed with son    The fourth dose/second booster is a very new recommendation from the FDA. We are waiting on the official [facility] recommendations regarding this matter. We expect there to be a forthcoming email/MyChart message from Andrew Mirza within the next two weeks. If you do not receive an update in this timeframe, feel free to check back. Please schedule an appointment if you would like an individual recommendation.

## 2022-05-29 RX ORDER — TRAMADOL HYDROCHLORIDE 50 MG/1
50 TABLET ORAL EVERY 8 HOURS
Qty: 60 TABLET | Refills: 0 | OUTPATIENT
Start: 2022-05-29

## 2022-06-03 NOTE — TELEPHONE ENCOUNTER
It is a controlled medicine so if he cannot get [facility] ff[facility] to prescribe it we will jocelynn a visit to provide it.    LOV 10/2021 with us

## 2022-06-03 NOTE — TELEPHONE ENCOUNTER
Patient's daughter Shavon Cifuentes called (on hipaa) requesting Dr. Quentin Bruce to send in a one time prescription for Tramadol. Dr. Tanna Franklin was supposed to but never sent it to patient and he will be out until mid June.  Patient was advised to contact PCP to see if he could prescribe for patient

## 2022-06-03 NOTE — TELEPHONE ENCOUNTER
LOV 10/25/2021 Dr Miles Jay has been prescribing this looks like he cannot send via e-scribe and mailed the script. Would you be willing to do this.

## 2022-06-29 NOTE — TELEPHONE ENCOUNTER
Pt son called and scheduled Pt's med refill appt with [facility] 7/12/22. Can a refill be ordered?

## 2022-07-12 NOTE — PATIENT INSTRUCTIONS
Check blood pressure 1-2 times weekly at different times each day. Sit quietly for at least 5 minutes prior to checking blood pressure. Sit up straight with both feet flat on the floor, with arm elevated to approximately the level of your heart. Record blood pressure readings (date, time and result) and bring readings with you to next visit in our office. If more than 30% of readings are over 140 on the top or 90 on the bottom notify office.

## 2022-09-19 DIAGNOSIS — I10 ESSENTIAL HYPERTENSION: ICD-10-CM

## 2022-09-19 RX ORDER — AMLODIPINE BESYLATE 5 MG/1
TABLET ORAL
Qty: 90 TABLET | Refills: 0 | OUTPATIENT
Start: 2022-09-19

## 2022-10-29 NOTE — TELEPHONE ENCOUNTER
Spoke to daughter, Guero Suh. Three way called pt's son, Janell Macedo. Davidraymundovic Remington will be calling back on Monday to schedule pt's MA Super visit.

## 2022-10-31 NOTE — TELEPHONE ENCOUNTER
Please enter lab orders for the patient's upcoming physical appointment. Physical scheduled: Your appointments     Date & Time Appointment Department [facility])    Dec 09, 2022 10:30 AM JANINA PERERA Supervisit with MD Bennie Hanson [address], [address], [place]  ([facility] [address])            Bennie 26, [address], Mirtha Toure [address] [place] [address] [phone]         [facility])     The patient has been notified to complete fasting labs prior to their physical appointment.

## 2022-11-01 NOTE — TELEPHONE ENCOUNTER
tamsulosin 0.4 MG Oral Cap  90 caps 3 refills, last filled on 11/4/21  Refill due-    Ferrous Sulfate (FEROSUL) 325 (65 Fe) MG Oral Tab  90 caps 3 refills, last filled on 11/4/21  Refill due-    metoprolol succinate ER 25 MG Oral Tablet 24 Hr  Filled, passed protocol    Last office visit 07/12/22, no follow up on file. Apt due by 10/2022. Apt made for 12/9/22 for LUCINDA. Routed to [address] [place] [address] for review and refill.

## 2022-12-01 NOTE — TELEPHONE ENCOUNTER
Received lab results from [facility], CBC, CMP, lipid. Pt has upcoming physical on 12/9/22. Placed in Dr. Helen pate for review.

## 2022-12-09 PROBLEM — M25.562 CHRONIC PAIN OF BOTH KNEES: Status: ACTIVE | Noted: 2022-12-09

## 2022-12-09 PROBLEM — M25.561 CHRONIC PAIN OF BOTH KNEES: Status: ACTIVE | Noted: 2022-12-09

## 2022-12-09 PROBLEM — G89.29 CHRONIC PAIN OF BOTH KNEES: Status: ACTIVE | Noted: 2022-12-09

## 2023-01-01 ENCOUNTER — PATIENT MESSAGE (OUTPATIENT)
Dept: FAMILY MEDICINE CLINIC | Facility: CLINIC | Age: 73
End: 2023-01-01

## 2023-01-01 RX ORDER — TORSEMIDE 20 MG/1
20 TABLET ORAL DAILY
Qty: 30 TABLET | Refills: 0 | Status: CANCELLED | OUTPATIENT
Start: 2023-01-01

## 2023-01-05 NOTE — TELEPHONE ENCOUNTER
Patient daughter Krista Calle called stated Dr. Bridger Noalnd agreed on patient taking Tramadol. However, medication was not sent to pharmacy. Daughter stated medication was handled by Dr. Volodymyr Geiger but wants Dr. Bridger Noland to take over because for some reason Dr. Bridger Noland can send rx to local pharmacy and Dr. Volodymyr Geiger can not. Is this something Dr. Bridger Noland can take over?   Tramadol 50mg    [facility] [address] [place] [address] AT THE [facility],[address] [place] [address], [phone], [phone]      Please notify Daughter Krista Calle

## 2023-01-09 NOTE — TELEPHONE ENCOUNTER
Spoke with [facility] and they states that they have 2 different files for Pt but have the script filled. They will combine files.  Notified daughter and also advised they follow up with [facility] regarding 2 different files

## 2023-01-09 NOTE — TELEPHONE ENCOUNTER
Patient's daughter called back and the [facility] is saying they never received the prescription please call. Please call daughter Neyda Andujar at [phone].

## 2023-02-21 NOTE — TELEPHONE ENCOUNTER
Future Appointments   Date Time Provider Cee Galicia   3/1/2023 10:30 AM NAP XR RM1 [facility] XRAY [facility] [place]   3/1/2023 11:00 AM NAP XR RM1 NAP XRAY [facility] [place]   3/1/2023 11:30 AM Elizabeth Varela PA-C [facility]       Patient is scheduled for xray and advised to complete prior to appt

## 2023-02-21 NOTE — TELEPHONE ENCOUNTER
Son scheduled appointment for patient. Patient will be seeing Renetta Genao for ABDULAZIZ Knee pain. Patient does not have imaging in  [place].      Future Appointments   Date Time Provider Cee Galicia   3/1/2023 11:30 AM Kev Hess PA-C [facility]

## 2023-03-20 NOTE — TELEPHONE ENCOUNTER
Called patient's son Rahel Murphy okay on verbal release to inform him once \order is placed we will give him a call and he can schedule at that time

## 2023-03-20 NOTE — TELEPHONE ENCOUNTER
Patient's son called to follow-up on the requested new order for MRI. Please call for update. Thanks.     Son can be reached at [phone]

## 2023-03-22 NOTE — TELEPHONE ENCOUNTER
Left detailed msg.   Noted this same test is already scheduled for 4/5/23    Future Appointments   Date Time Provider Cee Galicia   4/5/2023 12:45 PM [facility] MR RM4 (3T WIDE) [address] [place] [address]

## 2023-03-27 NOTE — TELEPHONE ENCOUNTER
Provider is aware of denial and no peer to peer will be done.  Patient needs to complete 6 weeks of conservative treatment

## 2023-03-27 NOTE — TELEPHONE ENCOUNTER
External physical therapy referral placed. Would recommend that he attempt physical therapy prior to ordering MRI again and considering injections.

## 2023-03-27 NOTE — TELEPHONE ENCOUNTER
Called patient and spoke to his daughter. I let her know about the injections being done after MRI is approved. Offered to fax PT order when she provides a fax number. Also let her know about Gabapentin being sent to pharmacy. Patient informed me that her dad is out of Tramadol and she understands that you did not order it originally but she wants to know if you can refill it since you are now the treating provider. Please advise.

## 2023-03-27 NOTE — TELEPHONE ENCOUNTER
I sent a prescription of Gabapentin to his pharmacy. He should take 1 capsule twice per day. It may take 1-2 weeks to start working, but this medication should help with his nerve pain while we start physical therapy. I cannot perform the spine injections so he will need to wait until we have an MRI to pursue those injections.

## 2023-03-28 ENCOUNTER — TELEPHONE (OUTPATIENT)
Dept: CASE MANAGEMENT | Age: 73
End: 2023-03-28

## 2023-03-28 NOTE — TELEPHONE ENCOUNTER
Lumbar Spine MRI denied by insurance. Noted this was already sent to [facility] as [facility]. Will include for continuity, adding Tara.

## 2023-03-28 NOTE — TELEPHONE ENCOUNTER
Sent patient the following message via [employer]:;  Good Morning! I am reaching out regarding a conversation I had with Kavon Kelsey yesterday. Shun's recommendation would be to try the gabapentin instead of the tramadol. Since the tramadol is not providing relief anyway, let's see how the gabapentin does in 1-2 weeks time. No rush but whenever you have the physical therapy location, please feel free to reply to this message with the facility name and fax number and I will respond again once it is faxed so that you can set up his appointments. If it's easier to call us feel free to do that instead, we can be reached at [phone].    Thank you,   Dhruv Reyes MA

## 2023-03-28 NOTE — TELEPHONE ENCOUNTER
MESSAGE FOR FYI PURPOSE ONLY:  Spoke to patient's daughter Matt Rogers and let her know the good news that the MRI was approved. She was ecstatic and said she was adding Carrianne to her prayer list. Gave her the number to central scheduling, I also advised them to make an appointment to go over results about a week after the MRI. She states that they prefer to hold off on PT until after the MRI. Will also stop tramadol and start Gabapentin as Marcus Ta advised.

## 2023-03-28 NOTE — TELEPHONE ENCOUNTER
The health plan has denied request. Listed below is the denial rationale. Please reach out to [place] before 03/29/2023 for a reconsideration. You may call [facility] at [phone], reference case # [id number]. Also, please reach out to patient with plan of care.      Thank you,

## 2023-04-05 ENCOUNTER — HOSPITAL ENCOUNTER (OUTPATIENT)
Dept: MRI IMAGING | Facility: HOSPITAL | Age: 73
Discharge: HOME | End: 2023-04-05
Attending: PHYSICIAN ASSISTANT
Payer: MEDICARE

## 2023-04-05 DIAGNOSIS — M54.16 LUMBAR RADICULOPATHY: ICD-10-CM

## 2023-04-05 PROCEDURE — 72148 MRI LUMBAR SPINE W/O DYE: CPT | Performed by: PHYSICIAN ASSISTANT

## 2023-04-10 ENCOUNTER — OFFICE VISIT (OUTPATIENT)
Dept: ORTHOPEDICS CLINIC | Facility: CLINIC | Age: 73
End: 2023-04-10
Payer: MEDICARE

## 2023-04-10 VITALS — HEIGHT: 66 IN | BODY MASS INDEX: 31.02 KG/M2 | WEIGHT: 193 LBS

## 2023-04-10 DIAGNOSIS — M54.16 SPINAL STENOSIS OF LUMBAR REGION WITH RADICULOPATHY: Primary | ICD-10-CM

## 2023-04-10 DIAGNOSIS — M48.061 SPINAL STENOSIS OF LUMBAR REGION WITH RADICULOPATHY: Primary | ICD-10-CM

## 2023-04-10 PROCEDURE — 99213 OFFICE O/P EST LOW 20 MIN: CPT | Performed by: PHYSICIAN ASSISTANT

## 2023-04-10 PROCEDURE — 3008F BODY MASS INDEX DOCD: CPT | Performed by: PHYSICIAN ASSISTANT

## 2023-04-10 NOTE — PROGRESS NOTES
[facility] Progress Note    Subjective: Patient returns to clinic after last being seen on 3/1/2023 for bilateral leg pain with symptoms most consistent with lumbar radiculopathy. He has been able to obtain an MRI of his lumbar spine on 4/5/2023 which is available for review today. Since last visit, he was prescribed gabapentin which the patient reports caused leg swelling and \"liver problems\", so he discontinued the gabapentin. His PCP, Dr. Mauricio Rodrigez, prescribed him tramadol which the patient does report has been helpful. Patient reports fatigue and pain felt in the legs that worsens with walking. Pain radiates through the bilateral lateral hips, right lateral thigh, right lateral leg, and bilateral plantar feet. Objective: Patient seated comfortably in the exam chair. Ambulating independently. Ambulating with a smooth, nonantalgic gait. Grossly neurologically intact. Alert and oriented x3. Nonlabored breathing. Imaging: MRI of the lumbar spine obtained on 4/5/2023 personally viewed, independently interpreted and radiology report read. There is diffuse degenerative disc disease throughout the lumbar spine. At the L3-4 level, there is mild central canal stenosis. At the L4-5 level, there is severe central canal stenosis with moderate to severe bilateral foraminal canal stenosis. At the L5-S1 level, there is moderate to severe central canal stenosis with moderate to severe right-sided foraminal canal stenosis. Assessment/Plan: 46-year-old male with lumbar stenosis in the lumbar spine and symptoms consistent with neurogenic claudication and lumbar radiculopathy. I discussed further treatment options for the patient given that he has unsuccessfully tried gabapentin. From medication standpoint, we are limited due to his chronic kidney disease. I did discuss low-dose of Lyrica used, but however the patient would prefer to avoid this.   They did ask about tramadol, and I did discuss narcotic prescriptions being managed by one provider, so I recommended that they reach back out to Dr. Adriane Flores for any further refills. We did discuss epidural steroid injections to temporarily alleviate pain. They did express interest in epidural steroid injections, and a referral to the pain clinic was provided with instructions to perform bilateral L5-S1 epidural steroid injections. I discussed with the patient that it may take multiple injections to achieve sustained pain relief. I did discuss that typically these injections could be repeated as early as 6 months out. I briefly discussed surgical intervention, however the patient expresses interest in avoiding surgery and continue with nonsurgical care. If injections do help the patient, and they may contact our office in the future if any repeat referrals are needed. If no significant improvement, we could prescribe renal adjusted dose of Lyrica or attempt physical therapy. The patient's questions were sought and answered satisfactorily. He is happy with the plan and will follow as advised. Josephine Palma PA-C  [facility] Orthopedic Surgery    This note was dictated using Dragon software. While it was briefly proofread prior to completion, some grammatical, spelling, and word choice errors due to dictation may still occur.

## 2023-04-14 ENCOUNTER — OFFICE VISIT (OUTPATIENT)
Dept: PAIN CLINIC | Facility: CLINIC | Age: 73
End: 2023-04-14
Payer: MEDICARE

## 2023-04-14 VITALS — OXYGEN SATURATION: 96 % | HEART RATE: 50 BPM | DIASTOLIC BLOOD PRESSURE: 62 MMHG | SYSTOLIC BLOOD PRESSURE: 150 MMHG

## 2023-04-14 DIAGNOSIS — M48.062 SPINAL STENOSIS OF LUMBAR REGION WITH NEUROGENIC CLAUDICATION: Primary | ICD-10-CM

## 2023-04-17 ENCOUNTER — TELEPHONE (OUTPATIENT)
Dept: NEUROLOGY | Facility: CLINIC | Age: 73
End: 2023-04-17

## 2023-04-17 NOTE — TELEPHONE ENCOUNTER
Prior authorization request completed for: Right L4/5, L5/S1 TFESI    Authorization # [id number]  Authorization dates: 4/17/2023 - 6/16/2023  CPT codes approved: 79603 / 95641   Number of visits/dates of service approved: 1  Physician: Dr. Jsoé Villanueva   Location: [facility]     Patient can be scheduled. Routed to Navigator.

## 2023-04-17 NOTE — TELEPHONE ENCOUNTER
Contacted patient's cell phone # and patient answered call and then hung up. Weotta message sent to patient.

## 2023-04-27 ENCOUNTER — APPOINTMENT (OUTPATIENT)
Dept: GENERAL RADIOLOGY | Facility: HOSPITAL | Age: 73
End: 2023-04-27
Attending: ANESTHESIOLOGY
Payer: MEDICARE

## 2023-04-27 ENCOUNTER — HOSPITAL ENCOUNTER (OUTPATIENT)
Facility: HOSPITAL | Age: 73
Setting detail: HOSPITAL OUTPATIENT SURGERY
Discharge: HOME | End: 2023-04-27
Attending: ANESTHESIOLOGY | Admitting: ANESTHESIOLOGY
Payer: MEDICARE

## 2023-04-27 VITALS
OXYGEN SATURATION: 100 % | WEIGHT: 193 LBS | HEIGHT: 66 IN | RESPIRATION RATE: 18 BRPM | BODY MASS INDEX: 31.02 KG/M2 | SYSTOLIC BLOOD PRESSURE: 155 MMHG | HEART RATE: 52 BPM | DIASTOLIC BLOOD PRESSURE: 50 MMHG | TEMPERATURE: 98 F

## 2023-04-27 PROCEDURE — 64483 NJX AA&/STRD TFRM EPI L/S 1: CPT | Performed by: ANESTHESIOLOGY

## 2023-04-27 PROCEDURE — 64484 NJX AA&/STRD TFRM EPI L/S EA: CPT | Performed by: ANESTHESIOLOGY

## 2023-04-27 PROCEDURE — 3E0R33Z INTRODUCTION OF ANTI-INFLAMMATORY INTO SPINAL CANAL, PERCUTANEOUS APPROACH: ICD-10-PCS | Performed by: ANESTHESIOLOGY

## 2023-04-27 PROCEDURE — 3E0R3BZ INTRODUCTION OF ANESTHETIC AGENT INTO SPINAL CANAL, PERCUTANEOUS APPROACH: ICD-10-PCS | Performed by: ANESTHESIOLOGY

## 2023-04-27 RX ORDER — SODIUM CHLORIDE 9 MG/ML
INJECTION INTRAVENOUS
Status: DISCONTINUED | OUTPATIENT
Start: 2023-04-27 | End: 2023-04-27

## 2023-04-27 RX ORDER — LIDOCAINE HYDROCHLORIDE 10 MG/ML
INJECTION, SOLUTION EPIDURAL; INFILTRATION; INTRACAUDAL; PERINEURAL
Status: DISCONTINUED | OUTPATIENT
Start: 2023-04-27 | End: 2023-04-27

## 2023-04-27 RX ORDER — DEXAMETHASONE SODIUM PHOSPHATE 10 MG/ML
INJECTION, SOLUTION INTRAMUSCULAR; INTRAVENOUS
Status: DISCONTINUED | OUTPATIENT
Start: 2023-04-27 | End: 2023-04-27

## 2023-04-27 NOTE — OPERATIVE REPORT
[facility]  Operative Report  2023     Carlos Fink Patient Status:  Hospital Outpatient Surgery    1950 MRN [id number]   Location [address] [address] Attending Bruce Da Silva MD   [address] Day # 0 ZOLTAN Green MD     Indication: Anali Jeffers is a 68year old male lumbar radiculitis    Preoperative Diagnosis:  Lumbar radiculitis [M54.16]    Postoperative Diagnosis: Same as above. Procedure performed: RIGHT LUMBAR 4-5, LUMBAR 5-SACRAL 1 TRANSFORAMINAL EPIDURAL STEROID INJECTION MULTIPLE LEVEL with local      Anesthesia: Local     EBL: Less than 1 ml. Procedure Description:  After reviewing the patient's history and performing a focused physical examination, the diagnosis was confirmed and contraindications such as infection and coagulopathy were ruled out. Following review of potential side effects and complications, including but not necessarily limited to infection, allergic reaction, local tissue breakdown, nerve injury, and paresis, the patient indicated they understood and agreed to proceed. After obtaining the informed consent, the patient was brought to the procedure room and monitored. In the prone position, following sterile prep and drape of the lumbar region,  the  L4 neural foramen was identified under fluoroscopy. The skin and subcutaneous tissue was anesthetized via 25-gauge 1.5\" needle with approximately 2 cc of 1% lidocaine. A 22-gauge 5\" Quincke spinal needle was introduced toward the inferior aspect of the junction between the transverse process and pedicle of the  L4 level atraumatically under fluoroscopic guidance. The needle was advanced into the anterior epidural space at this level. The needle position was confirmed under AP and lateral fluoroscopic view. Following negative aspiration for CSF and blood, approximately 1 cc of Omnipaque 240 was injected.   An excellent contrast spread along the epidural space and the nerve root was obtained. At this point, 1cc of NS with 5 mg of dexamethasone was injected without complication. The needle was withdrawn with stylet in situ after being flushed with 1 cc PF lidocaine. The  L5 neural foramen was also identified under fluoroscopy. The skin and subcutaneous tissue was anesthetized via 25-gauge 1.5\" needle with approximately 2 cc of 1% lidocaine. A 22-gauge 5\" Quincke spinal needle was introduced toward the inferior aspect of the junction between the transverse process and pedicle of the L5 level atraumatically under fluoroscopic guidance. The needle was advanced into the anterior epidural space at this level. The needle position was confirmed under AP and lateral fluoroscopic view. Following negative aspiration for CSF and blood, approximately 1 cc of Omnipaque 240 was injected. An excellent contrast spread along the epidural space and the nerve root was obtained. At this point, 1cc of NS with 5 mg of dexamethasone was injected without complication. The needle was withdrawn with stylet in situ after being flushed with 1 cc PF lidocaine. .  The patient tolerated procedure very well. The patient was observed until discharge criteria met. Discharge instructions were given and patient was released to a responsible adult. Complications: None. Follow up: The patient was followed in the pain clinic as needed basis.         Mami Brasher MD

## 2023-04-27 NOTE — DISCHARGE INSTRUCTIONS
Home Care Instructions Following Your Pain Procedure     Vena Leak,  It has been a pleasure to have you as our patient. To help you at home, you must follow these general discharge instructions. We will review these with you before you are discharged. It is our hope that you have a complete and uneventful recovery from our procedure. General Instructions:  What to Expect:  Bandages from your procedure today can be removed when you get home. Please avoid soaking and/or swimming for 24 hours. Showering is okay  It is normal to have increased pain symptoms and/or pain at injection site for up to 3-5 days after procedure, you can use heat or ice (20 minutes on 20 minutes off) for comfort. You may experience some temporary side effects which may include restlessness or insomnia, flushing of the face, or heart palpitations. Please contact the provider if these symptoms do not resolve within 3-4 days. Lightheadedness or nausea may occur and should resolve within 24 to 48 hours. If you develop a headache after treatment, rest, drink fluids (with caffeine, if possible) and take mild over-the-counter pain medication. If the headache does not improve with the above treatment, contact the physician. Home Medications:  Resume all previously prescribed medication. Please avoid taking NSAIDs (Non-Steriodal Anti-Inflammatory Drugs) such as:  Ibuprofen ( Advil, Motrin) Aleve (Naproxen), Diclofenac, Meloxicam for 6 hours after procedure. If you are on Coumadin (Warfarin) or any other anti-coagulant (or \"blood thinning\") medication such as Plavix (Clopidogrel), Xarelto (Rivaroxaban), Eliquis (Apixaban), Effient (Prasugrel) etc., restart on the following day from the procedure unless otherwise directed by your provider. If you are a diabetic, please increase the frequency of your glucose monitoring after the procedure as steroids may cause a temporary (2-3 day) increase in your blood sugar.   Contact your primary care physician if your blood sugar remains elevated as you may require some medication adjustment. Diet:  Resume your regular diet as tolerated. Activity: We recommend that you relax and rest during the rest of your procedure day. If you feel weakness in your arms or legs do not drive. Follow-up Appointment  Please schedule a follow-up visit within 3 to 4 weeks after your last procedure date. Question or Concerns:  Feel free to call our office with any questions or concerns at [phone] (option #2)    Bravo Bhatti  Thank you for coming to [facility] for your procedure. The nurses try very hard to make sure you receive the best care possible. Your trust in them as well as us is greatly appreciated.     Thanks so much,   Dr. Ynes Raymundo

## 2023-04-28 ENCOUNTER — TELEPHONE (OUTPATIENT)
Dept: PAIN CLINIC | Facility: CLINIC | Age: 73
End: 2023-04-28

## 2023-04-28 NOTE — TELEPHONE ENCOUNTER
Follow-up call post pain procedure. Left message informing patient to contact the pain clinic at [phone] option #2 regarding any questions or concerns about recent pain procedure. Spoke with daughter who hasn't talked to him yet today, advised her to call back only if they have any questions or concerns.     Procedure: RIGHT LUMBAR 4-5, LUMBAR 5-SACRAL 1 TRANSFORAMINAL EPIDURAL STEROID INJECTION MULTIPLE LEVEL with local  Date: 04/27/23  Follow up Visit Scheduled: 05/10/23

## 2023-05-10 ENCOUNTER — OFFICE VISIT (OUTPATIENT)
Dept: PAIN CLINIC | Facility: CLINIC | Age: 73
End: 2023-05-10
Payer: MEDICARE

## 2023-05-10 VITALS — DIASTOLIC BLOOD PRESSURE: 72 MMHG | HEART RATE: 53 BPM | OXYGEN SATURATION: 98 % | SYSTOLIC BLOOD PRESSURE: 132 MMHG

## 2023-05-10 DIAGNOSIS — M54.16 LUMBAR RADICULOPATHY: Primary | ICD-10-CM

## 2023-05-10 PROCEDURE — 1159F MED LIST DOCD IN RCRD: CPT | Performed by: ANESTHESIOLOGY

## 2023-05-10 PROCEDURE — 3078F DIAST BP <80 MM HG: CPT | Performed by: ANESTHESIOLOGY

## 2023-05-10 PROCEDURE — 99214 OFFICE O/P EST MOD 30 MIN: CPT | Performed by: ANESTHESIOLOGY

## 2023-05-10 PROCEDURE — 3075F SYST BP GE 130 - 139MM HG: CPT | Performed by: ANESTHESIOLOGY

## 2023-05-10 NOTE — PROGRESS NOTES
Last procedure: RIGHT LUMBAR 4-5, LUMBAR 5-SACRAL 1 TRANSFORAMINAL EPIDURAL STEROID INJECTION MULTIPLE LEVEL with local  Date: 4/27/23  Percentage of relief obtained: 100%  Duration of relief: 48 hours, now returned to baseline    Current Pain Score: 8/10

## 2023-05-15 RX ORDER — TRAMADOL HYDROCHLORIDE 50 MG/1
TABLET ORAL
Qty: 60 TABLET | Refills: 0 | Status: SHIPPED | OUTPATIENT
Start: 2023-05-15

## 2023-05-15 NOTE — TELEPHONE ENCOUNTER
Chronic med, but controlled  We will need to get into a better routine as these are supposed to be filled only at office visits. Will need to try to set this up.  Appt in a month

## 2023-05-16 RX ORDER — TRAMADOL HYDROCHLORIDE 50 MG/1
50 TABLET ORAL EVERY 8 HOURS
Qty: 60 TABLET | Refills: 0 | OUTPATIENT
Start: 2023-05-16

## 2023-06-13 ENCOUNTER — OFFICE VISIT (OUTPATIENT)
Dept: FAMILY MEDICINE CLINIC | Facility: CLINIC | Age: 73
End: 2023-06-13
Payer: MEDICARE

## 2023-06-13 VITALS
SYSTOLIC BLOOD PRESSURE: 132 MMHG | HEIGHT: 66 IN | HEART RATE: 70 BPM | RESPIRATION RATE: 16 BRPM | BODY MASS INDEX: 31.12 KG/M2 | DIASTOLIC BLOOD PRESSURE: 60 MMHG | WEIGHT: 193.63 LBS

## 2023-06-13 DIAGNOSIS — F17.210 TOBACCO SMOKER, LESS THAN 10 CIGARETTES PER DAY: ICD-10-CM

## 2023-06-13 DIAGNOSIS — E78.5 HYPERLIPIDEMIA, UNSPECIFIED HYPERLIPIDEMIA TYPE: ICD-10-CM

## 2023-06-13 DIAGNOSIS — I10 ESSENTIAL HYPERTENSION: ICD-10-CM

## 2023-06-13 DIAGNOSIS — M25.561 CHRONIC PAIN OF BOTH KNEES: ICD-10-CM

## 2023-06-13 DIAGNOSIS — Z12.11 COLON CANCER SCREENING: ICD-10-CM

## 2023-06-13 DIAGNOSIS — G89.29 CHRONIC PAIN OF BOTH KNEES: ICD-10-CM

## 2023-06-13 DIAGNOSIS — R73.03 PRE-DIABETES: ICD-10-CM

## 2023-06-13 DIAGNOSIS — N13.8 BPH WITH OBSTRUCTION/LOWER URINARY TRACT SYMPTOMS: ICD-10-CM

## 2023-06-13 DIAGNOSIS — I12.9 CKD STAGE 4 SECONDARY TO HYPERTENSION (HCC): ICD-10-CM

## 2023-06-13 DIAGNOSIS — F10.20 ANEMIA DUE TO ALCOHOLISM (HCC): ICD-10-CM

## 2023-06-13 DIAGNOSIS — Z00.00 ENCOUNTER FOR ANNUAL HEALTH EXAMINATION: Primary | ICD-10-CM

## 2023-06-13 DIAGNOSIS — K70.30 ALCOHOLIC CIRRHOSIS OF LIVER WITHOUT ASCITES (HCC): ICD-10-CM

## 2023-06-13 DIAGNOSIS — M54.16 LUMBAR RADICULOPATHY: ICD-10-CM

## 2023-06-13 DIAGNOSIS — N18.4 CKD STAGE 4 SECONDARY TO HYPERTENSION (HCC): ICD-10-CM

## 2023-06-13 DIAGNOSIS — D64.89 ANEMIA DUE TO ALCOHOLISM (HCC): ICD-10-CM

## 2023-06-13 DIAGNOSIS — M25.562 CHRONIC PAIN OF BOTH KNEES: ICD-10-CM

## 2023-06-13 DIAGNOSIS — K21.9 GASTROESOPHAGEAL REFLUX DISEASE, UNSPECIFIED WHETHER ESOPHAGITIS PRESENT: ICD-10-CM

## 2023-06-13 DIAGNOSIS — N40.1 BPH WITH OBSTRUCTION/LOWER URINARY TRACT SYMPTOMS: ICD-10-CM

## 2023-06-13 RX ORDER — TRAMADOL HYDROCHLORIDE 100 MG/1
1 TABLET, COATED ORAL DAILY
Qty: 30 TABLET | Refills: 2 | Status: SHIPPED | OUTPATIENT
Start: 2023-06-13 | End: 2023-07-13

## 2023-06-15 DIAGNOSIS — E78.5 HYPERLIPIDEMIA, UNSPECIFIED HYPERLIPIDEMIA TYPE: ICD-10-CM

## 2023-06-19 DIAGNOSIS — E78.5 HYPERLIPIDEMIA, UNSPECIFIED HYPERLIPIDEMIA TYPE: ICD-10-CM

## 2023-06-19 RX ORDER — ROSUVASTATIN CALCIUM 10 MG/1
10 TABLET, COATED ORAL NIGHTLY
Qty: 90 TABLET | Refills: 1 | OUTPATIENT
Start: 2023-06-19

## 2023-06-19 RX ORDER — ROSUVASTATIN CALCIUM 10 MG/1
10 TABLET, COATED ORAL NIGHTLY
Qty: 90 TABLET | Refills: 0 | Status: SHIPPED | OUTPATIENT
Start: 2023-06-19

## 2023-07-25 DIAGNOSIS — I10 ESSENTIAL HYPERTENSION: ICD-10-CM

## 2023-07-31 RX ORDER — TAMSULOSIN HYDROCHLORIDE 0.4 MG/1
0.4 CAPSULE ORAL DAILY
Qty: 90 CAPSULE | Refills: 3 | Status: SHIPPED | OUTPATIENT
Start: 2023-07-31

## 2023-07-31 RX ORDER — METOPROLOL SUCCINATE 25 MG/1
25 TABLET, EXTENDED RELEASE ORAL 2 TIMES DAILY
Qty: 180 TABLET | Refills: 3 | Status: SHIPPED | OUTPATIENT
Start: 2023-07-31

## 2023-07-31 NOTE — TELEPHONE ENCOUNTER
Requested Prescriptions     Pending Prescriptions Disp Refills    metoprolol succinate ER 25 MG Oral Tablet 24 Hr 180 tablet 2     Sig: Take 1 tablet (25 mg total) by mouth 2 (two) times daily. tamsulosin 0.4 MG Oral Cap 90 capsule 2     Sig: Take 1 capsule (0.4 mg total) by mouth daily. LOV 6/13/2023 annual exam    Patient was asked to follow-up in: Follow-up not documented in note    Appointment scheduled: Visit date not found     Labs not completed please advise. Medication failed protocol.

## 2023-08-04 RX ORDER — FERROUS SULFATE 325(65) MG
1 TABLET ORAL
Qty: 90 TABLET | Refills: 2 | Status: SHIPPED | OUTPATIENT
Start: 2023-08-04

## 2023-08-04 NOTE — TELEPHONE ENCOUNTER
Refill request for:    Requested Prescriptions     Pending Prescriptions Disp Refills    Ferrous Sulfate (FEROSUL) 325 (65 Fe) MG Oral Tab 90 tablet 2     Sig: Take 1 tablet (325 mg total) by mouth daily with breakfast.        Last Prescribed Quantity Refills          LOV 6/13/2023     Patient was asked to follow-up in: not documented in note    Appointment scheduled: Visit date not found    Medication not on protocol.

## 2023-08-17 ENCOUNTER — PATIENT MESSAGE (OUTPATIENT)
Dept: FAMILY MEDICINE CLINIC | Facility: CLINIC | Age: 73
End: 2023-08-17

## 2023-08-17 DIAGNOSIS — M25.561 CHRONIC PAIN OF BOTH KNEES: Primary | ICD-10-CM

## 2023-08-17 DIAGNOSIS — G89.29 CHRONIC PAIN OF BOTH KNEES: Primary | ICD-10-CM

## 2023-08-17 DIAGNOSIS — M25.562 CHRONIC PAIN OF BOTH KNEES: Primary | ICD-10-CM

## 2023-08-17 LAB
AMB EXT ANION GAP: 12
AMB EXT BILIRUBIN, TOTAL: 0.4 MG/DL
AMB EXT BUN: 83 MG/DL
AMB EXT CALCIUM: 9.5
AMB EXT CARBON DIOXIDE: 17
AMB EXT CHLORIDE: 108
AMB EXT CHOL/HDL RATIO: 3.5
AMB EXT CHOLESTEROL, TOTAL: 98 MG/DL
AMB EXT CMP ALT: 11 U/L (ref 0–55)
AMB EXT CMP AST: 12 U/L (ref 5–34)
AMB EXT CREATININE: 4.39 MG/DL
AMB EXT EGFR NON-AA: 13
AMB EXT EGFR-AA: 16
AMB EXT GLUCOSE: 98 MG/DL
AMB EXT HDL CHOLESTEROL: 28 MG/DL
AMB EXT HEMOGLOBIN: 8.7
AMB EXT HGBA1C: 5.8 %
AMB EXT LDL CHOLESTEROL, DIRECT: 56 MG/DL
AMB EXT MCV: 100
AMB EXT NON HDL CHOL: 70 MG/DL
AMB EXT PLATELETS: 150
AMB EXT POSTASSIUM: 5.3 MMOL/L
AMB EXT SODIUM: 137 MMOL/L
AMB EXT TOTAL PROTEIN: 7.1 (ref 6.3–8.2)
AMB EXT TRIGLYCERIDES: 70 MG/DL
AMB EXT VLDL: 14 MG/DL
AMB EXT WBC: 7.2 X10(3)UL

## 2023-08-17 NOTE — TELEPHONE ENCOUNTER
From: Rosalba Melchor  Sent: 8/17/2023 12:33 PM CDT  To: [facility] Clinical Staff  Subject: Gwenyth Babinski    My father recently picked up his last bottle of 100 mg Tramadol without refills left. Will Doctor be able to provide another set of refills or will he have to schedule an appointment?

## 2023-08-18 RX ORDER — TRAMADOL HYDROCHLORIDE 100 MG/1
100 TABLET, COATED ORAL 2 TIMES DAILY PRN
Qty: 60 TABLET | Refills: 0 | Status: SHIPPED | OUTPATIENT
Start: 2023-08-18 | End: 2023-09-17

## 2023-08-29 ENCOUNTER — TELEPHONE (OUTPATIENT)
Dept: FAMILY MEDICINE CLINIC | Facility: CLINIC | Age: 73
End: 2023-08-29

## 2023-08-29 DIAGNOSIS — I10 ESSENTIAL HYPERTENSION: ICD-10-CM

## 2023-08-29 RX ORDER — AMLODIPINE BESYLATE 5 MG/1
5 TABLET ORAL DAILY
Qty: 90 TABLET | Refills: 1 | Status: SHIPPED | OUTPATIENT
Start: 2023-08-29

## 2023-08-29 RX ORDER — ENALAPRIL MALEATE 5 MG/1
5 TABLET ORAL DAILY
Qty: 90 TABLET | Refills: 1 | Status: SHIPPED | OUTPATIENT
Start: 2023-08-29

## 2023-09-10 DIAGNOSIS — G89.29 CHRONIC PAIN OF BOTH KNEES: ICD-10-CM

## 2023-09-10 DIAGNOSIS — M25.562 CHRONIC PAIN OF BOTH KNEES: ICD-10-CM

## 2023-09-10 DIAGNOSIS — M25.561 CHRONIC PAIN OF BOTH KNEES: ICD-10-CM

## 2023-09-11 DIAGNOSIS — E78.5 HYPERLIPIDEMIA, UNSPECIFIED HYPERLIPIDEMIA TYPE: ICD-10-CM

## 2023-09-11 RX ORDER — ROSUVASTATIN CALCIUM 10 MG/1
10 TABLET, COATED ORAL NIGHTLY
Qty: 90 TABLET | Refills: 0 | Status: SHIPPED | OUTPATIENT
Start: 2023-09-11

## 2023-09-11 NOTE — TELEPHONE ENCOUNTER
Refill request for:    Requested Prescriptions     Pending Prescriptions Disp Refills    traMADol HCl 100 MG Oral Tab 60 tablet 0     Sig: Take 100 mg by mouth 2 (two) times daily as needed (pains). Last Prescribed Quantity Refills   8/18/23 60 0     LOV 6/13/2023     Patient was asked to follow-up in: not documented in note    Appointment scheduled: 9/19/2023 Nilay Shomeaker MD    Medication not on protocol.      Routed to Perez Mo MD for review

## 2023-09-11 NOTE — TELEPHONE ENCOUNTER
Requested Prescriptions     Pending Prescriptions Disp Refills    rosuvastatin 10 MG Oral Tab 90 tablet 0     Sig: Take 1 tablet (10 mg total) by mouth nightly. LOV 6/13/2023     Patient was asked to follow-up in: Follow-up not documented in note    Appointment scheduled: 9/19/2023 Griffin Floyd MD     Medication refilled per protocol.

## 2023-09-19 ENCOUNTER — TELEMEDICINE (OUTPATIENT)
Dept: FAMILY MEDICINE CLINIC | Facility: CLINIC | Age: 73
End: 2023-09-19
Payer: MEDICARE

## 2023-09-19 DIAGNOSIS — M25.561 CHRONIC PAIN OF BOTH KNEES: Primary | ICD-10-CM

## 2023-09-19 DIAGNOSIS — M25.562 CHRONIC PAIN OF BOTH KNEES: Primary | ICD-10-CM

## 2023-09-19 DIAGNOSIS — M19.011 ARTHRITIS OF RIGHT SHOULDER REGION: ICD-10-CM

## 2023-09-19 DIAGNOSIS — G89.29 CHRONIC PAIN OF BOTH KNEES: Primary | ICD-10-CM

## 2023-09-19 PROCEDURE — 99213 OFFICE O/P EST LOW 20 MIN: CPT | Performed by: FAMILY MEDICINE

## 2023-09-19 PROCEDURE — 1159F MED LIST DOCD IN RCRD: CPT | Performed by: FAMILY MEDICINE

## 2023-09-19 PROCEDURE — 1160F RVW MEDS BY RX/DR IN RCRD: CPT | Performed by: FAMILY MEDICINE

## 2023-09-19 RX ORDER — TRAMADOL HYDROCHLORIDE 100 MG/1
100 TABLET, COATED ORAL 2 TIMES DAILY PRN
Qty: 60 TABLET | Refills: 0 | Status: SHIPPED | OUTPATIENT
Start: 2023-11-01 | End: 2023-12-01

## 2023-09-19 RX ORDER — TRAMADOL HYDROCHLORIDE 100 MG/1
100 TABLET, COATED ORAL 2 TIMES DAILY PRN
Qty: 60 TABLET | Refills: 0 | OUTPATIENT
Start: 2023-09-19 | End: 2023-10-19

## 2023-09-19 NOTE — PROGRESS NOTES
No chief complaint on file. Shiloh Queen is a 68year old male who presents via video encounter. HPI:   Chronic pains - spine, knees. Has seen specialist, injections, but limited effect. Unable to take NSAIDs, and tylenol ineffective (and limited by liver disease). Taking tramadol. Now no 100mg. This does help the pain. He takes it once a day. In the AM, and this helps. [place]. Given 60tabs. Taking one a day. Went to ER on the 9th for R shoulder pain. Arthritis flare up. XR showed moderate arthritis. This has happened in the past. He was given norco in the ER and prednisone. Currently, he has been feeling better, but the prednisone kept him awake. Recently, the arthritis in shoulder and neck are the big issues. REVIEW OF SYSTEMS:  Pertinent items are noted in HPI. Physical Exam:  alert, appears stated age, and cooperative, Speaking in full sentences comfortably, and Normal work of breathing  With son on the video        Assessment and Plan  Diagnoses and all orders for this visit:    Chronic pain of both knees  -     traMADol HCl 100 MG Oral Tab; Take 100 mg by mouth 2 (two) times daily as needed. Arthritis of right shoulder region       Medicines refilled  As detailed above, limited to take OTC meds due to renal and liver issues  Tramadol 100mg once a day effective  Has a full bottle now  Refill called in for 11/1/23  [facility] [address] [facility] [place] Shoulder - avoid overuse. If issues persist, see PT. This visit is conducted using Telemedicine with live, interactive video and audio. [place] at the time of the encounter.       Julio Sheridan M.D.   [facility]  09/19/23

## 2023-11-20 RX ORDER — FAMOTIDINE 20 MG/1
20 TABLET, FILM COATED ORAL DAILY
Qty: 90 TABLET | Refills: 3 | Status: SHIPPED | OUTPATIENT
Start: 2023-11-20

## 2023-11-20 RX ORDER — TORSEMIDE 20 MG/1
20 TABLET ORAL DAILY
Qty: 30 TABLET | Refills: 0 | Status: SHIPPED | OUTPATIENT
Start: 2023-11-20

## 2023-11-20 RX ORDER — TORSEMIDE 20 MG/1
20 TABLET ORAL DAILY
Qty: 90 TABLET | Refills: 0 | OUTPATIENT
Start: 2023-11-20

## 2023-12-04 DIAGNOSIS — E78.5 HYPERLIPIDEMIA, UNSPECIFIED HYPERLIPIDEMIA TYPE: ICD-10-CM

## 2023-12-04 RX ORDER — ROSUVASTATIN CALCIUM 10 MG/1
10 TABLET, COATED ORAL NIGHTLY
Qty: 90 TABLET | Refills: 0 | Status: SHIPPED | OUTPATIENT
Start: 2023-12-04

## 2023-12-04 NOTE — TELEPHONE ENCOUNTER
Requested Prescriptions     Pending Prescriptions Disp Refills    rosuvastatin 10 MG Oral Tab 90 tablet 0     Sig: Take 1 tablet (10 mg total) by mouth nightly. LOV 6/13/2023     Patient was asked to follow-up in: Follow-up not documented in note    Appointment scheduled: 1/16/2024 Dana Fry MD     Medication refilled per protocol.

## 2023-12-18 RX ORDER — TORSEMIDE 20 MG/1
20 TABLET ORAL DAILY
Qty: 90 TABLET | Refills: 1 | Status: SHIPPED | OUTPATIENT
Start: 2023-12-18

## 2023-12-24 ENCOUNTER — TELEPHONE (OUTPATIENT)
Dept: FAMILY MEDICINE CLINIC | Facility: CLINIC | Age: 73
End: 2023-12-24

## 2023-12-24 DIAGNOSIS — G89.29 CHRONIC PAIN OF BOTH KNEES: ICD-10-CM

## 2023-12-24 DIAGNOSIS — M25.561 CHRONIC PAIN OF BOTH KNEES: ICD-10-CM

## 2023-12-24 DIAGNOSIS — M25.562 CHRONIC PAIN OF BOTH KNEES: ICD-10-CM

## 2023-12-26 RX ORDER — TRAMADOL HYDROCHLORIDE 100 MG/1
1 TABLET, COATED ORAL 2 TIMES DAILY PRN
Qty: 60 TABLET | Refills: 0 | OUTPATIENT
Start: 2023-12-26

## 2023-12-26 RX ORDER — DUTASTERIDE 0.5 MG/1
0.5 CAPSULE, LIQUID FILLED ORAL DAILY
Qty: 90 CAPSULE | Refills: 3 | OUTPATIENT
Start: 2023-12-26

## 2023-12-26 RX ORDER — DUTASTERIDE 0.5 MG/1
0.5 CAPSULE, LIQUID FILLED ORAL DAILY
Refills: 0 | Status: CANCELLED | OUTPATIENT
Start: 2023-12-26

## 2023-12-26 RX ORDER — PREDNISONE 10 MG/1
TABLET ORAL
COMMUNITY
Start: 2023-09-09

## 2023-12-27 NOTE — TELEPHONE ENCOUNTER
From: Mega Silva  To: Trell Taylor  Sent: 12/27/2023 1:10 PM CST  Subject: HI this is Mega Cortes we have a telehealth visit tomorrow at 10:45. I wanted to send the letter I received from my insurance company in regards to the tramadol no longer covered.

## 2023-12-28 ENCOUNTER — TELEMEDICINE (OUTPATIENT)
Dept: FAMILY MEDICINE CLINIC | Facility: CLINIC | Age: 73
End: 2023-12-28
Payer: MEDICARE

## 2023-12-28 DIAGNOSIS — M25.561 CHRONIC PAIN OF BOTH KNEES: ICD-10-CM

## 2023-12-28 DIAGNOSIS — G89.29 CHRONIC PAIN OF BOTH KNEES: ICD-10-CM

## 2023-12-28 DIAGNOSIS — I10 ESSENTIAL HYPERTENSION: ICD-10-CM

## 2023-12-28 DIAGNOSIS — M25.562 CHRONIC PAIN OF BOTH KNEES: ICD-10-CM

## 2023-12-28 PROCEDURE — 1160F RVW MEDS BY RX/DR IN RCRD: CPT | Performed by: FAMILY MEDICINE

## 2023-12-28 PROCEDURE — 99213 OFFICE O/P EST LOW 20 MIN: CPT | Performed by: FAMILY MEDICINE

## 2023-12-28 PROCEDURE — 1159F MED LIST DOCD IN RCRD: CPT | Performed by: FAMILY MEDICINE

## 2023-12-28 RX ORDER — DUTASTERIDE 0.5 MG/1
0.5 CAPSULE, LIQUID FILLED ORAL DAILY
Qty: 90 CAPSULE | Refills: 3 | Status: SHIPPED | OUTPATIENT
Start: 2023-12-28

## 2023-12-28 RX ORDER — AMLODIPINE BESYLATE 5 MG/1
5 TABLET ORAL DAILY
Qty: 90 TABLET | Refills: 1 | Status: SHIPPED | OUTPATIENT
Start: 2023-12-28

## 2023-12-28 RX ORDER — TRAMADOL HYDROCHLORIDE 100 MG/1
100 TABLET, COATED ORAL 3 TIMES DAILY
Qty: 90 TABLET | Refills: 0 | Status: SHIPPED | OUTPATIENT
Start: 2023-12-28 | End: 2024-01-27

## 2023-12-28 RX ORDER — ENALAPRIL MALEATE 5 MG/1
5 TABLET ORAL DAILY
Qty: 90 TABLET | Refills: 1 | Status: SHIPPED | OUTPATIENT
Start: 2023-12-28

## 2023-12-28 NOTE — PROGRESS NOTES
Chief Complaint   Patient presents with    Pain          Javan Jolley is a 68year old male who presents via video encounter. HPI:   Taking tramadol 100mg. This has been effective. Taking less than he was previously. Takes one time a day. Also taking glucosamine. Between the both, this is pretty effective. Unable to use NSAIDs due to renal issues. REVIEW OF SYSTEMS:  Pertinent items are noted in HPI. Physical Exam:  alert, appears stated age, and cooperative, Speaking in full sentences comfortably, and Normal work of breathing        Assessment and Plan  Diagnoses and all orders for this visit:    Chronic pain of both knees  -     traMADol HCl 100 MG Oral Tab; Take 100 mg by mouth in the morning, at noon, and at bedtime. Essential hypertension  -     amLODIPine 5 MG Oral Tab; Take 1 tablet (5 mg total) by mouth daily. -     enalapril 5 MG Oral Tab; Take 1 tablet (5 mg total) by mouth daily. Other orders  -     dutasteride 0.5 MG Oral Cap; Take 1 capsule (0.5 mg total) by mouth daily. Meds refilled  Insurance will no longer cover 100mg coming 2024. May need to switch to 50mg BID. This visit is conducted using Telemedicine with live, interactive video and audio. [place] in [place] at the time of the encounter.     Ivonne Humphreys M.D.   [facility]  12/28/23

## 2024-03-21 NOTE — TELEPHONE ENCOUNTER
Apt changed to 12/28
Called and talked to son they have a video visit scheduled for 1/16/24 but insurance will no longer pay for the tramadol after 1/1/24 so they would like a prescription before then so insurance will cover this.
LOV 6/13/23 with Dr Viera Player refills pending
Please call to schedule sooner virtual appointment as approved by Dr. Honey Iqbal below
Son is calling patient will be out of the medications, if not done by 1/1/24 he will no longer be covered.
Then let's move up the appt.  Ok to add in wherever
We need to see him q3 months to manage these medications and this is not anything new. Needs to be seen. Virtual ok.
Undomiciled

## (undated) DEVICE — BANDAID CURAD 3IN X 1IN

## (undated) DEVICE — NEEDLE SPINAL 22X3-1/2 BLK

## (undated) DEVICE — REMOVER PREP SOLUTION 4OZ

## (undated) DEVICE — GLOVE SURG SENSICARE SZ 7-1/2

## (undated) DEVICE — GLOVE SURG SENSICARE SZ 6-1/2

## (undated) DEVICE — PAIN TRAY: Brand: MEDLINE INDUSTRIES, INC.

## (undated) NOTE — Clinical Note
I know you had stopped the enalapril earlier due to kidney. Amlodipine is not cutting it. Would he be a candidate for getting back on a ACEI or ARB is the kidney numbers are mostly steady?   Thanks  -Chanelle Quick

## (undated) NOTE — Clinical Note
Colonoscopy - yes, last 3 yrs ago. Done at [facility] in [facility]. Can we track this down?